# Patient Record
Sex: FEMALE | Race: WHITE | NOT HISPANIC OR LATINO | URBAN - METROPOLITAN AREA
[De-identification: names, ages, dates, MRNs, and addresses within clinical notes are randomized per-mention and may not be internally consistent; named-entity substitution may affect disease eponyms.]

---

## 2017-02-22 ENCOUNTER — OFFICE VISIT (OUTPATIENT)
Dept: URGENT CARE | Facility: URGENT CARE | Age: 42
End: 2017-02-22
Payer: COMMERCIAL

## 2017-02-22 VITALS
DIASTOLIC BLOOD PRESSURE: 74 MMHG | OXYGEN SATURATION: 97 % | HEART RATE: 93 BPM | RESPIRATION RATE: 16 BRPM | SYSTOLIC BLOOD PRESSURE: 102 MMHG | HEIGHT: 67 IN | BODY MASS INDEX: 19.62 KG/M2 | TEMPERATURE: 99.3 F | WEIGHT: 125 LBS

## 2017-02-22 DIAGNOSIS — R07.0 THROAT PAIN: Primary | ICD-10-CM

## 2017-02-22 LAB
DEPRECATED S PYO AG THROAT QL EIA: NORMAL
MICRO REPORT STATUS: NORMAL
SPECIMEN SOURCE: NORMAL

## 2017-02-22 PROCEDURE — 99213 OFFICE O/P EST LOW 20 MIN: CPT | Performed by: PHYSICIAN ASSISTANT

## 2017-02-22 PROCEDURE — 87081 CULTURE SCREEN ONLY: CPT | Performed by: FAMILY MEDICINE

## 2017-02-22 PROCEDURE — 87880 STREP A ASSAY W/OPTIC: CPT | Performed by: FAMILY MEDICINE

## 2017-02-22 NOTE — MR AVS SNAPSHOT
"              After Visit Summary   2017    Lupe Mendosa    MRN: [id number]           Patient Information     Date Of Birth          1975        Visit Information        Provider Department      2017 6:45 PM Angelia Santiago PA-C [facility]        Today's Diagnoses     Throat pain    -  1       Follow-ups after your visit        Who to contact     If you have questions or need follow up information about today's clinic visit or your schedule please contact [facility] directly at [phone].  Normal or non-critical lab and imaging results will be communicated to you by [email or web address], letter or phone within 4 business days after the clinic has received the results. If you do not hear from us within 7 days, please contact the clinic through [email or web address] or phone. If you have a critical or abnormal lab result, we will notify you by phone as soon as possible.  Submit refill requests through MessageGears or call your pharmacy and they will forward the refill request to us. Please allow 3 business days for your refill to be completed.          Additional Information About Your Visit        MyChart Information     MessageGears lets you send messages to your doctor, view your test results, renew your prescriptions, schedule appointments and more. To sign up, go to [email or web address] . Click on \"Log in\" on the left side of the screen, which will take you to the Welcome page. Then click on \"Sign up Now\" on the right side of the page.     You will be asked to enter the access code listed below, as well as some personal information. Please follow the directions to create your username and password.     Your access code is: [email or web address]  Expires: 2017  8:10 PM     Your access code will  in 90 days. If you need help or a new code, please call your [place] clinic or [phone].        Care EveryWhere ID     This is your Care EveryWhere ID. This could be used by other " "organizations to access your [place] medical records  [id number]        Your Vitals Were     Pulse Temperature Respirations Height Last Period Pulse Oximetry    93 99.3  F (37.4  C) (Oral) 16 5' 7\" (1.702 m) 02/01/2017 97%    Breastfeeding? BMI (Body Mass Index)                No 19.58 kg/m2           Blood Pressure from Last 3 Encounters:   02/22/17 102/74   05/29/15 114/62   05/01/11 110/84    Weight from Last 3 Encounters:   02/22/17 125 lb (56.7 kg)   05/29/15 120 lb (54.4 kg)              We Performed the Following     Beta strep group A culture     Strep, Rapid Screen        Primary Care Provider Office Phone # Fax #    Stacy BRUMFIELD Dhruv [phone]       [facility] [address]  [place] [place] [address]        Thank you!     Thank you for choosing [facility]  for your care. Our goal is always to provide you with excellent care. Hearing back from our patients is one way we can continue to improve our services. Please take a few minutes to complete the written survey that you may receive in the mail after your visit with us. Thank you!             Your Updated Medication List - Protect others around you: Learn how to safely use, store and throw away your medicines at www.disposemymeds.org.          This list is accurate as of: 2/22/17  8:10 PM.  Always use your most recent med list.                   Brand Name Dispense Instructions for use    SYNTHROID PO      Take  by mouth.         "

## 2017-02-23 NOTE — PROGRESS NOTES
HPI  Lupe is a 42 yo female who presents for sore throat x 3 days. Was mostly in the morning and night but has felt raw all day today.  Reports mild cough. Denies fever or muscles aches.      ROS  See HPI    Physical Exam    Vitals & nursing notes reviewed.  B/P: 102/74, T: 99.3, P: 93, R: 16  Constitutional:  Alert, well nourished, well-developed, NAD  Head:  Atraumatic, normocephalic  Eyes:  Perrla, EOMI, conjunctiva:  Pink   Sclera:  Anicteric  Ears:  Canals clear BL, TM pearly BL  Throat:  (+) mild erythema, No exudates, or edema to postoropharynx  Neck:  Supple, no cervical LAD  Lungs:  CTA, no wheezes, rhonchi, or rales  CV:  RRR,  no murmur appreciated    ASSESSMENT  (R07.0) Throat pain  (primary encounter diagnosis)  Comment: RST negative. Culture pending  Plan: Warm saline gargle BID-TID. Tylenol or advil for pain & fever PRN.  F/U with PCP in 3-5 days if sx persist or worsen.

## 2017-02-23 NOTE — NURSING NOTE
"Chief Complaint   Patient presents with     Urgent Care     Pharyngitis     sore throat, feels raw since Sunday, some chills.        Initial /74  Pulse 93  Temp 99.3  F (37.4  C) (Oral)  Resp 16  Ht 5' 7\" (1.702 m)  Wt 125 lb (56.7 kg)  LMP 02/01/2017  SpO2 97%  Breastfeeding? No  BMI 19.58 kg/m2 Estimated body mass index is 19.58 kg/(m^2) as calculated from the following:    Height as of this encounter: 5' 7\" (1.702 m).    Weight as of this encounter: 125 lb (56.7 kg).  Medication Reconciliation: complete  "

## 2017-02-24 LAB
BACTERIA SPEC CULT: NORMAL
MICRO REPORT STATUS: NORMAL
SPECIMEN SOURCE: NORMAL

## 2017-03-02 ENCOUNTER — OFFICE VISIT - HEALTHEAST (OUTPATIENT)
Dept: FAMILY MEDICINE | Facility: CLINIC | Age: 42
End: 2017-03-02

## 2017-03-02 ENCOUNTER — COMMUNICATION - HEALTHEAST (OUTPATIENT)
Dept: FAMILY MEDICINE | Facility: CLINIC | Age: 42
End: 2017-03-02

## 2017-03-02 ENCOUNTER — RECORDS - HEALTHEAST (OUTPATIENT)
Dept: GENERAL RADIOLOGY | Facility: CLINIC | Age: 42
End: 2017-03-02

## 2017-03-02 DIAGNOSIS — J18.9 PNEUMONIA INVOLVING RIGHT LUNG: ICD-10-CM

## 2017-03-02 DIAGNOSIS — R05.9 COUGH: ICD-10-CM

## 2017-03-02 DIAGNOSIS — E03.9 HYPOTHYROID: ICD-10-CM

## 2017-03-02 ASSESSMENT — MIFFLIN-ST. JEOR: SCORE: 1244.83

## 2017-03-08 ENCOUNTER — COMMUNICATION - HEALTHEAST (OUTPATIENT)
Dept: FAMILY MEDICINE | Facility: CLINIC | Age: 42
End: 2017-03-08

## 2017-03-20 ENCOUNTER — HOSPITAL ENCOUNTER (OUTPATIENT)
Dept: MAMMOGRAPHY | Facility: HOSPITAL | Age: 42
Discharge: HOME | End: 2017-03-20
Attending: FAMILY MEDICINE

## 2017-03-20 DIAGNOSIS — Z12.31 VISIT FOR SCREENING MAMMOGRAM: ICD-10-CM

## 2017-03-31 ENCOUNTER — COMMUNICATION - HEALTHEAST (OUTPATIENT)
Dept: FAMILY MEDICINE | Facility: CLINIC | Age: 42
End: 2017-03-31

## 2017-03-31 DIAGNOSIS — E03.9 HYPOTHYROIDISM: ICD-10-CM

## 2017-04-03 ENCOUNTER — COMMUNICATION - HEALTHEAST (OUTPATIENT)
Dept: FAMILY MEDICINE | Facility: CLINIC | Age: 42
End: 2017-04-03

## 2017-04-03 ENCOUNTER — AMBULATORY - HEALTHEAST (OUTPATIENT)
Dept: LAB | Facility: CLINIC | Age: 42
End: 2017-04-03

## 2017-04-03 DIAGNOSIS — E03.9 HYPOTHYROIDISM: ICD-10-CM

## 2017-04-03 DIAGNOSIS — E03.9 HYPOTHYROID: ICD-10-CM

## 2017-07-10 ENCOUNTER — OFFICE VISIT - HEALTHEAST (OUTPATIENT)
Dept: FAMILY MEDICINE | Facility: CLINIC | Age: 42
End: 2017-07-10

## 2017-07-10 DIAGNOSIS — E03.9 HYPOTHYROID: ICD-10-CM

## 2017-07-10 DIAGNOSIS — Z00.00 ROUTINE GENERAL MEDICAL EXAMINATION AT A HEALTH CARE FACILITY: ICD-10-CM

## 2017-07-10 ASSESSMENT — MIFFLIN-ST. JEOR: SCORE: 1249.37

## 2017-07-20 LAB
BKR LAB AP ABNORMAL BLEEDING: NO
BKR LAB AP BIRTH CONTROL/HORMONES: NORMAL
BKR LAB AP CERVICAL APPEARANCE: NORMAL
BKR LAB AP GYN ADEQUACY: NORMAL
BKR LAB AP GYN INTERPRETATION: NORMAL
BKR LAB AP GYN OTHER FINDINGS: NORMAL
BKR LAB AP HPV REFLEX: NO
BKR LAB AP LMP: NORMAL
BKR LAB AP PATIENT STATUS: NORMAL
BKR LAB AP PREVIOUS ABNORMAL: NO
BKR LAB AP PREVIOUS NORMAL: 2016
HIGH RISK?: NO
PATH REPORT.COMMENTS IMP SPEC: NORMAL
RESULT FLAG (HE HISTORICAL CONVERSION): NORMAL

## 2017-09-22 ENCOUNTER — RECORDS - HEALTHEAST (OUTPATIENT)
Dept: ADMINISTRATIVE | Facility: OTHER | Age: 42
End: 2017-09-22

## 2018-04-18 ENCOUNTER — COMMUNICATION - HEALTHEAST (OUTPATIENT)
Dept: FAMILY MEDICINE | Facility: CLINIC | Age: 43
End: 2018-04-18

## 2018-04-18 DIAGNOSIS — R63.5 UNEXPLAINED WEIGHT GAIN: ICD-10-CM

## 2018-04-18 DIAGNOSIS — E03.9 HYPOTHYROIDISM: ICD-10-CM

## 2018-04-20 ENCOUNTER — AMBULATORY - HEALTHEAST (OUTPATIENT)
Dept: LAB | Facility: CLINIC | Age: 43
End: 2018-04-20

## 2018-04-20 DIAGNOSIS — R63.5 UNEXPLAINED WEIGHT GAIN: ICD-10-CM

## 2018-04-20 DIAGNOSIS — E03.9 HYPOTHYROIDISM: ICD-10-CM

## 2018-04-20 LAB
CHOLEST SERPL-MCNC: 204 MG/DL
FASTING STATUS PATIENT QL REPORTED: YES
FASTING STATUS PATIENT QL REPORTED: YES
GLUCOSE BLD-MCNC: 90 MG/DL (ref 70–99)
HDLC SERPL-MCNC: 58 MG/DL
HGB BLD-MCNC: 12.6 G/DL (ref 12–16)
LDLC SERPL CALC-MCNC: 125 MG/DL
TRIGL SERPL-MCNC: 105 MG/DL
TSH SERPL DL<=0.005 MIU/L-ACNC: 1.26 UIU/ML (ref 0.3–5)

## 2018-04-23 LAB
25(OH)D3 SERPL-MCNC: 19.1 NG/ML (ref 30–80)
25(OH)D3 SERPL-MCNC: 19.1 NG/ML (ref 30–80)

## 2018-06-04 ENCOUNTER — HOSPITAL ENCOUNTER (OUTPATIENT)
Dept: MAMMOGRAPHY | Facility: CLINIC | Age: 43
Discharge: HOME | End: 2018-06-04
Attending: FAMILY MEDICINE

## 2018-06-04 DIAGNOSIS — Z12.31 VISIT FOR SCREENING MAMMOGRAM: ICD-10-CM

## 2018-08-17 ENCOUNTER — COMMUNICATION - HEALTHEAST (OUTPATIENT)
Dept: FAMILY MEDICINE | Facility: CLINIC | Age: 43
End: 2018-08-17

## 2018-08-17 DIAGNOSIS — E03.9 HYPOTHYROID: ICD-10-CM

## 2018-08-28 ENCOUNTER — COMMUNICATION - HEALTHEAST (OUTPATIENT)
Dept: FAMILY MEDICINE | Facility: CLINIC | Age: 43
End: 2018-08-28

## 2018-09-10 ENCOUNTER — OFFICE VISIT - HEALTHEAST (OUTPATIENT)
Dept: FAMILY MEDICINE | Facility: CLINIC | Age: 43
End: 2018-09-10

## 2018-09-10 DIAGNOSIS — E55.9 VITAMIN D DEFICIENCY: ICD-10-CM

## 2018-09-10 DIAGNOSIS — E03.9 ACQUIRED HYPOTHYROIDISM: ICD-10-CM

## 2018-09-10 DIAGNOSIS — Z00.00 ENCOUNTER FOR ROUTINE ADULT HEALTH EXAMINATION WITHOUT ABNORMAL FINDINGS: ICD-10-CM

## 2018-09-10 DIAGNOSIS — M54.2 NECK PAIN: ICD-10-CM

## 2018-09-10 LAB
25(OH)D3 SERPL-MCNC: 25.4 NG/ML (ref 30–80)
25(OH)D3 SERPL-MCNC: 25.4 NG/ML (ref 30–80)
TSH SERPL DL<=0.005 MIU/L-ACNC: 1.54 UIU/ML (ref 0.3–5)

## 2018-09-10 ASSESSMENT — MIFFLIN-ST. JEOR: SCORE: 1266.38

## 2018-09-12 LAB
BKR LAB AP ABNORMAL BLEEDING: NO
BKR LAB AP BIRTH CONTROL/HORMONES: NORMAL
BKR LAB AP CERVICAL APPEARANCE: NORMAL
BKR LAB AP GYN ADEQUACY: NORMAL
BKR LAB AP GYN INTERPRETATION: NORMAL
BKR LAB AP GYN OTHER FINDINGS: NORMAL
BKR LAB AP HPV REFLEX: NORMAL
BKR LAB AP LMP: NORMAL
BKR LAB AP PATIENT STATUS: NORMAL
BKR LAB AP PREVIOUS ABNORMAL: NORMAL
BKR LAB AP PREVIOUS NORMAL: NORMAL
HIGH RISK?: NO
PATH REPORT.COMMENTS IMP SPEC: NORMAL
RESULT FLAG (HE HISTORICAL CONVERSION): NORMAL

## 2018-12-07 ENCOUNTER — COMMUNICATION - HEALTHEAST (OUTPATIENT)
Dept: FAMILY MEDICINE | Facility: CLINIC | Age: 43
End: 2018-12-07

## 2018-12-07 DIAGNOSIS — E03.9 HYPOTHYROID: ICD-10-CM

## 2019-05-20 ENCOUNTER — COMMUNICATION - HEALTHEAST (OUTPATIENT)
Dept: FAMILY MEDICINE | Facility: CLINIC | Age: 44
End: 2019-05-20

## 2019-05-21 ENCOUNTER — RECORDS - HEALTHEAST (OUTPATIENT)
Dept: MAMMOGRAPHY | Facility: CLINIC | Age: 44
End: 2019-05-21

## 2019-05-21 DIAGNOSIS — Z12.31 ENCOUNTER FOR SCREENING MAMMOGRAM FOR MALIGNANT NEOPLASM OF BREAST: ICD-10-CM

## 2019-08-18 ENCOUNTER — COMMUNICATION - HEALTHEAST (OUTPATIENT)
Dept: FAMILY MEDICINE | Facility: CLINIC | Age: 44
End: 2019-08-18

## 2019-08-18 DIAGNOSIS — E03.9 HYPOTHYROID: ICD-10-CM

## 2019-11-26 ENCOUNTER — COMMUNICATION - HEALTHEAST (OUTPATIENT)
Dept: FAMILY MEDICINE | Facility: CLINIC | Age: 44
End: 2019-11-26

## 2019-11-26 DIAGNOSIS — E03.9 HYPOTHYROID: ICD-10-CM

## 2020-07-28 ENCOUNTER — COMMUNICATION - HEALTHEAST (OUTPATIENT)
Dept: FAMILY MEDICINE | Facility: CLINIC | Age: 45
End: 2020-07-28

## 2020-09-17 ENCOUNTER — COMMUNICATION - HEALTHEAST (OUTPATIENT)
Dept: FAMILY MEDICINE | Facility: CLINIC | Age: 45
End: 2020-09-17

## 2020-09-24 ENCOUNTER — COMMUNICATION - HEALTHEAST (OUTPATIENT)
Dept: FAMILY MEDICINE | Facility: CLINIC | Age: 45
End: 2020-09-24

## 2020-12-04 ENCOUNTER — COMMUNICATION - HEALTHEAST (OUTPATIENT)
Dept: FAMILY MEDICINE | Facility: CLINIC | Age: 45
End: 2020-12-04

## 2020-12-04 DIAGNOSIS — E03.9 HYPOTHYROID: ICD-10-CM

## 2020-12-28 ENCOUNTER — COMMUNICATION - HEALTHEAST (OUTPATIENT)
Dept: FAMILY MEDICINE | Facility: CLINIC | Age: 45
End: 2020-12-28

## 2021-04-14 ENCOUNTER — HOSPITAL ENCOUNTER (OUTPATIENT)
Dept: MAMMOGRAPHY | Facility: CLINIC | Age: 46
Discharge: HOME | End: 2021-04-14
Attending: FAMILY MEDICINE

## 2021-04-14 DIAGNOSIS — Z12.31 VISIT FOR SCREENING MAMMOGRAM: ICD-10-CM

## 2021-04-20 ENCOUNTER — HOSPITAL ENCOUNTER (OUTPATIENT)
Dept: ULTRASOUND IMAGING | Facility: CLINIC | Age: 46
Discharge: HOME | End: 2021-04-20
Attending: FAMILY MEDICINE

## 2021-04-20 ENCOUNTER — HOSPITAL ENCOUNTER (OUTPATIENT)
Dept: MAMMOGRAPHY | Facility: CLINIC | Age: 46
Discharge: HOME | End: 2021-04-20
Attending: FAMILY MEDICINE

## 2021-04-20 DIAGNOSIS — N64.89 BREAST ASYMMETRY: ICD-10-CM

## 2021-05-13 ENCOUNTER — OFFICE VISIT - HEALTHEAST (OUTPATIENT)
Dept: FAMILY MEDICINE | Facility: CLINIC | Age: 46
End: 2021-05-13

## 2021-05-13 DIAGNOSIS — E55.9 VITAMIN D DEFICIENCY: ICD-10-CM

## 2021-05-13 DIAGNOSIS — E03.9 ACQUIRED HYPOTHYROIDISM: ICD-10-CM

## 2021-05-13 DIAGNOSIS — Z00.00 ROUTINE GENERAL MEDICAL EXAMINATION AT A HEALTH CARE FACILITY: ICD-10-CM

## 2021-05-13 LAB
CHOLEST SERPL-MCNC: 226 MG/DL
FASTING STATUS PATIENT QL REPORTED: YES
FASTING STATUS PATIENT QL REPORTED: YES
GLUCOSE BLD-MCNC: 91 MG/DL (ref 70–125)
HDLC SERPL-MCNC: 69 MG/DL
HGB BLD-MCNC: 11.4 G/DL (ref 12–16)
LDLC SERPL CALC-MCNC: 136 MG/DL
TRIGL SERPL-MCNC: 107 MG/DL
TSH SERPL DL<=0.005 MIU/L-ACNC: 2.14 UIU/ML (ref 0.3–5)

## 2021-05-13 ASSESSMENT — MIFFLIN-ST. JEOR: SCORE: 1298.36

## 2021-05-14 LAB
25(OH)D3 SERPL-MCNC: 30.5 NG/ML (ref 30–80)
25(OH)D3 SERPL-MCNC: 30.5 NG/ML (ref 30–80)
HPV SOURCE: NORMAL
HUMAN PAPILLOMA VIRUS 16 DNA: NEGATIVE
HUMAN PAPILLOMA VIRUS 18 DNA: NEGATIVE
HUMAN PAPILLOMA VIRUS FINAL DIAGNOSIS: NORMAL
HUMAN PAPILLOMA VIRUS OTHER HR: NEGATIVE
SPECIMEN DESCRIPTION: NORMAL

## 2021-05-19 LAB
BKR LAB AP ABNORMAL BLEEDING: NO
BKR LAB AP BIRTH CONTROL/HORMONES: NORMAL
BKR LAB AP CERVICAL APPEARANCE: NORMAL
BKR LAB AP GYN ADEQUACY: NORMAL
BKR LAB AP GYN INTERPRETATION: NORMAL
BKR LAB AP HPV REFLEX: NORMAL
BKR LAB AP LMP: NORMAL
BKR LAB AP PATIENT STATUS: NORMAL
BKR LAB AP PREVIOUS ABNORMAL: NO
BKR LAB AP PREVIOUS NORMAL: 2018
HIGH RISK?: NO
PATH REPORT.COMMENTS IMP SPEC: NORMAL
RESULT FLAG (HE HISTORICAL CONVERSION): NORMAL

## 2021-05-27 ENCOUNTER — RECORDS - HEALTHEAST (OUTPATIENT)
Dept: ADMINISTRATIVE | Facility: CLINIC | Age: 46
End: 2021-05-27

## 2021-05-27 VITALS
OXYGEN SATURATION: 99 % | WEIGHT: 139.3 LBS | SYSTOLIC BLOOD PRESSURE: 122 MMHG | HEIGHT: 67 IN | HEART RATE: 86 BPM | BODY MASS INDEX: 21.87 KG/M2 | DIASTOLIC BLOOD PRESSURE: 78 MMHG

## 2021-05-29 ENCOUNTER — RECORDS - HEALTHEAST (OUTPATIENT)
Dept: ADMINISTRATIVE | Facility: CLINIC | Age: 46
End: 2021-05-29

## 2021-05-30 VITALS — HEIGHT: 67 IN | BODY MASS INDEX: 20.25 KG/M2 | WEIGHT: 129 LBS

## 2021-05-31 VITALS — WEIGHT: 130 LBS | HEIGHT: 67 IN | BODY MASS INDEX: 20.4 KG/M2

## 2021-05-31 NOTE — TELEPHONE ENCOUNTER
Refill Approved    Rx renewed per Medication Renewal Policy. Medication was last renewed on 12/9/2018        Ayan Field, [facility] Triage/Med Refill 8/18/2019     Requested Prescriptions   Pending Prescriptions Disp Refills     levothyroxine (SYNTHROID, LEVOTHROID) 75 MCG tablet [Pharmacy Med Name: LEVOTHYROXINE 0.075MG (75MCG) TABS] 90 tablet 0     Sig: TAKE 1 TABLET BY MOUTH EVERY DAY AT 6 AM       Thyroid Hormones Protocol Passed - 8/18/2019  3:36 AM        Passed - Provider visit in past 12 months or next 3 months     Last office visit with prescriber/PCP: Visit date not found OR same dept: Visit date not found OR same specialty: 3/2/2017 Angelina Troy CNP  Last physical: 9/10/2018 Last MTM visit: Visit date not found   Next visit within 3 mo: Visit date not found  Next physical within 3 mo: Visit date not found  Prescriber OR PCP: Stacy Bartlett MD  Last diagnosis associated with med order: 1. Hypothyroid  - levothyroxine (SYNTHROID, LEVOTHROID) 75 MCG tablet [Pharmacy Med Name: LEVOTHYROXINE 0.075MG (75MCG) TABS]; TAKE 1 TABLET BY MOUTH EVERY DAY AT 6 AM  Dispense: 90 tablet; Refill: 0    If protocol passes may refill for 12 months if within 3 months of last provider visit (or a total of 15 months).             Passed - TSH on file in past 12 months for patient age 12 & older     TSH   Date Value Ref Range Status   09/10/2018 1.54 0.30 - 5.00 uIU/mL Final

## 2021-06-02 VITALS — BODY MASS INDEX: 20.72 KG/M2 | WEIGHT: 132 LBS | HEIGHT: 67 IN

## 2021-06-03 NOTE — TELEPHONE ENCOUNTER
RN cannot approve Refill Request    RN can NOT refill this medication PCP messaged that patient is overdue for Labs and Office Visit. Last office visit: Visit date not found Last Physical: 9/10/2018 Last MTM visit: Visit date not found Last visit same specialty: 3/2/2017 Angelina Troy CNP.  Next visit within 3 mo: Visit date not found  Next physical within 3 mo: Visit date not found      Bailee ZIMMER Patty, [facility] Triage/Med Refill 11/28/2019    Requested Prescriptions   Pending Prescriptions Disp Refills     levothyroxine (SYNTHROID, LEVOTHROID) 75 MCG tablet 60 tablet 0       Thyroid Hormones Protocol Failed - 11/26/2019  8:13 AM        Failed - Provider visit in past 12 months or next 3 months     Last office visit with prescriber/PCP: Visit date not found OR same dept: Visit date not found OR same specialty: 3/2/2017 Angelina Troy CNP  Last physical: 9/10/2018 Last MTM visit: Visit date not found   Next visit within 3 mo: Visit date not found  Next physical within 3 mo: Visit date not found  Prescriber OR PCP: Stacy Bartlett MD  Last diagnosis associated with med order: 1. Hypothyroid  - levothyroxine (SYNTHROID, LEVOTHROID) 75 MCG tablet  Dispense: 60 tablet; Refill: 0    If protocol passes may refill for 12 months if within 3 months of last provider visit (or a total of 15 months).             Failed - TSH on file in past 12 months for patient age 12 & older     TSH   Date Value Ref Range Status   09/10/2018 1.54 0.30 - 5.00 uIU/mL Final

## 2021-06-09 NOTE — PROGRESS NOTES
"  Subjective   Chief Complaint:  Cough (pt states she has been sick since 2/19, had an RST on 2/22 which was negative; 10 year old child was dx with pneumonia in early february); Fatigue (needs to nap once or twice a day); and Fever (low grade fever around )    HPI:   Lupe Mendosa is a 41 y.o. female who presents for evaluation of cough and sore throat.      She states illness began almost two weeks ago with severe sore throat.  She was seen in Urgent Care 2/22 and RST was negative.  She reports sore throat eventually cleared though cough worsened.  Is productive, feels heavy in chest.  Has felt fatigued throughout illness though markedly worse last few days.  Low grade fever at home, 102.1 in clinic.      No exposure to influenza or strep.  No prior history of respiratory issues.      PMH:   Patient Active Problem List   Diagnosis     Nephrolithiasis     Hypothyroidism       Past Medical History:   Diagnosis Date     Hypothyroidism        Current Medications:   Current Outpatient Prescriptions on File Prior to Visit   Medication Sig Dispense Refill     levothyroxine (SYNTHROID, LEVOTHROID) 75 MCG tablet TAKE 1 TABLET BY MOUTH DAILY 90 tablet 3     No current facility-administered medications on file prior to visit.        Allergies:  is allergic to amoxicillin and meperidine.    SH/FH:  Social History and Family History reviewed and updated.   Tobacco Status:  She  reports that she has never smoked. She has never used smokeless tobacco.    Review of Systems:  A complete head to toe ROS is negative unless otherwise noted in HPI    Objective     Vitals:    03/02/17 1336   BP: 120/60   Patient Site: Left Arm   Patient Position: Sitting   Cuff Size: Adult Regular   Pulse: (!) 117   Temp: (!) 102.1  F (38.9  C)   TempSrc: Oral   SpO2: 98%   Weight: 129 lb (58.5 kg)   Height: 5' 6.5\" (1.689 m)     Wt Readings from Last 3 Encounters:   03/02/17 129 lb (58.5 kg)   03/08/16 129 lb (58.5 kg)   02/25/16 (P) 127 lb " (57.6 kg)       Physical Exam:  GENERAL: Alert, appears fatigued  HEAD: Normocephalic, atraumatic  EYES: Conjunctiva pink, sclera white, no exudates.   EARS: TMs pearly grey, no bulging, redness, retraction. Hearing grossly normal.   NOSE: Nares patent, no discharge.   MOUTH: Pharynx moist, pink without exudate. No tonsillar enlargement  NECK: No lymphadenopathy.   CV: Regular rate and rhythm without murmurs, rubs or gallops.  RESP:  Rales bilaterally, faint expiratory wheeze.     Xray: Infiltrates in RUL, RML    Labs:    No results found for this or any previous visit (from the past 168 hour(s)).    Assessment & Plan   1. Pneumonia involving right lung:  Infiltrates on xray in RUL, RML.  Penicillin allergy.  Will treat with doxycycline.  Reviewed expected timeframe for resolution of symptoms.  If no improvement in 72 hours, return for reevaluation.   - XR Chest PA and Lateral; Future  - doxycycline (MONODOX) 100 MG capsule; Take 1 capsule (100 mg total) by mouth 2 (two) times a day for 10 days.  Dispense: 20 capsule; Refill: 0    Angelina Troy, CNP

## 2021-06-11 NOTE — TELEPHONE ENCOUNTER
Patient Returning Call  Reason for call:  Returning vm   Information relayed to patient:  Relayed message below   Patient has additional questions:  Yes  If YES, what are your questions/concerns:  Current mailing address is the right place to send  [phone]  [place] [address]  Okay to leave a detailed message?: No call back needed

## 2021-06-11 NOTE — PROGRESS NOTES
Assessment/Plan:       1. Routine general medical examination at a health care facility  Healthy 41-year-old female.  She would like to continue with yearly Pap smears.  We will just do cytology today and defer repeat HPV testing only if needed or at 5 years out which will be 2020.  Breast exam is normal.  She will be due for follow-up of her mammogram in September.  She had an abnormal mammogram with biopsy last year which turned out to be normal.  She continues with self breast exams.  She will return for fasting labs in October to include blood sugar lipid cascade hemoglobin and thyroid.  Encouraged healthy eating and exercise.  - Gynecologic Cytology (PAP Smear)  - Glucose; Future  - Lipid [place] FASTING; Future  - Hemoglobin; Future    2. Hypothyroid  Currently denying any symptoms.  Will continue at 75 mcg daily and recheck in 6 months from her last check which will be October.  - levothyroxine (SYNTHROID, LEVOTHROID) 75 MCG tablet; Take 1 tablet (75 mcg total) by mouth daily.  Dispense: 90 tablet; Refill: 4  - Thyroid Cascade; Future            Subjective:     Lupe Mendosa is a 41 y.o. female who presents for an annual exam.  Overall doing well.  This last year was complicated by having a abnormal mammogram resulting in a breast biopsy in her left breast.  She also had a episode of pneumonia.  These have all resolved at the breast biopsy was benign.  She has no new complaints.  She does wish to continue with yearly Pap smears.  She denies any thyroid symptoms.    Healthy Habits:   Healthy Diet: Yes  Regular Exercise: Yes  Sunscreen Use: Yes  Dental Visits Regularly: Yes  Seat Belt: Yes  Self Breast Exam Monthly:Yes    Health Maintenance reviewed - yes.  Lipid Profile: Yes  Glucose Screen: Yes  Last Mammogram: Yes  Colonoscopy: N/A  Last Dexa: N/A    Immunization History   Administered Date(s) Administered     DT (pediatric) 05/23/2000     Influenza, seasonal,quad inj 6-35 mos 10/04/2013     Tdap  2011     Immunization status: up to date and documented.      Gynecologic History  Patient's last menstrual period was 2017.  Sexually active: Yes  Contraception: condoms  Last Pap: . Results were: normal      OB History    Para Term  AB Living   2 2 2      SAB TAB Ectopic Multiple Live Births             # Outcome Date GA Lbr Rian/2nd Weight Sex Delivery Anes PTL Lv   2 Term            1 Term                   Current Outpatient Prescriptions   Medication Sig Dispense Refill     levothyroxine (SYNTHROID, LEVOTHROID) 75 MCG tablet Take 1 tablet (75 mcg total) by mouth daily. 90 tablet 4     No current facility-administered medications for this visit.      Past Medical History:   Diagnosis Date     Hypothyroidism      Past Surgical History:   Procedure Laterality Date     BREAST BIOPSY Left 2016     Amoxicillin and Meperidine  Family History   Problem Relation Age of Onset     Heart disease Father      Heart disease Maternal Aunt      Pancreatic cancer Maternal Grandmother      Lung cancer Maternal Aunt      Social History     Social History     Marital status:      Spouse name: N/A     Number of children: N/A     Years of education: N/A     Occupational History     Not on file.     Social History Main Topics     Smoking status: Never Smoker     Smokeless tobacco: Never Used     Alcohol use 0.6 oz/week     1 Glasses of wine per week     Drug use: No     Sexual activity: Not on file     Other Topics Concern     Not on file     Social History Narrative       Review of Systems  General:  Denies problem  Eyes: Denies problem  Ears/Nose/Throat: Denies problem  Cardiovascular: Denies problem  Respiratory:  Denies problem  Gastrointestinal:  Denies problem, Genitourinary: Denies problem  Musculoskeletal:  Denies problem  Skin: Denies problem  Neurologic: Denies problem  Psychiatric: Denies problem  Endocrine: Denies problem  Heme/Lymphatic: Denies problem   Allergic/Immunologic:  "Denies problem            Objective:        Vitals:    07/10/17 1042   BP: 104/78   Pulse: 64   Resp: 16   Weight: 130 lb (59 kg)   Height: 5' 6.5\" (1.689 m)       Physical Exam:  General Appearance: Alert, pleasant, appears stated age  Head: Normocephalic, without obvious abnormality  Eyes: PERRL, conjunctiva/corneas clear, EOM's intact  Ears: Normal TM's and external ear canals, both ears  Nose: Nares normal, septum midline,mucosa normal, no drainage  Throat: Lips, mucosa, and tongue normal; teeth and gums normal; oropharynx is clear  Neck: Supple,without lymphadenopathy or thyromegally  Lungs: Clear to auscultation bilaterally, respirations unlabored  Breasts: Nopalpable masses, tenderness, asymmetry, or nipple discharge. No axillary or supraclavicular lymphadenopathy  Heart: Regular rate and rhythm, no murmur   Abdomen: Soft, non-tender, no masses, no organomegaly  Pelvic:Normally developed genitalia with no external lesions or eruptions. Vagina and cervix show no lesions, inflammation, discharge or tenderness. No cystocele, No rectocele. Uterus normal.  No adnexal mass or tenderness.    Extremities: Extremities with strong and symmetric pulses, no cyanosis or edema  Skin: Skin color, texture normal, no rashes or lesions    Neurologic: Normal          "

## 2021-06-11 NOTE — TELEPHONE ENCOUNTER
I reached out to pt and no answer. I left a vm. If pt returns call please ask her to confirm her home address so that I can mail her form.

## 2021-06-13 NOTE — TELEPHONE ENCOUNTER
RN cannot approve Refill Request    RN can NOT refill this medication PCP messaged that patient is overdue for Office Visit. Last office visit: Visit date not found Last Physical: 9/10/2018 Last MTM visit: Visit date not found Last visit same specialty: 3/2/2017 Angelina Troy CNP.  Next visit within 3 mo: Visit date not found  Next physical within 3 mo: Visit date not found      Linda Mathis, [facility] Triage/Med Refill 12/5/2020    Requested Prescriptions   Pending Prescriptions Disp Refills     levothyroxine (SYNTHROID, LEVOTHROID) 75 MCG tablet [Pharmacy Med Name: LEVOTHYROXINE 0.075MG (75MCG) TABS] 60 tablet 11     Sig: TAKE 1 TABLET BY MOUTH EVERY DAY AT 6:00AM.       Thyroid Hormones Protocol Failed - 12/4/2020  8:51 AM        Failed - Provider visit in past 12 months or next 3 months     Last office visit with prescriber/PCP: Visit date not found OR same dept: Visit date not found OR same specialty: 3/2/2017 Angelina Troy CNP  Last physical: 9/10/2018 Last MTM visit: Visit date not found   Next visit within 3 mo: Visit date not found  Next physical within 3 mo: Visit date not found  Prescriber OR PCP: Stacy Bartlett MD  Last diagnosis associated with med order: 1. Hypothyroid  - levothyroxine (SYNTHROID, LEVOTHROID) 75 MCG tablet [Pharmacy Med Name: LEVOTHYROXINE 0.075MG (75MCG) TABS]; TAKE 1 TABLET BY MOUTH EVERY DAY AT 6:00AM.  Dispense: 60 tablet; Refill: 11    If protocol passes may refill for 12 months if within 3 months of last provider visit (or a total of 15 months).             Failed - TSH on file in past 12 months for patient age 12 & older     TSH   Date Value Ref Range Status   09/10/2018 1.54 0.30 - 5.00 uIU/mL Final

## 2021-06-16 PROBLEM — E55.9 VITAMIN D DEFICIENCY: Status: ACTIVE | Noted: 2018-09-10

## 2021-06-17 NOTE — PROGRESS NOTES
ASSESSMENT/PLAN:  1. Routine general medical examination at a health care facility  Healthy 45-year-old female.  Pap smear is completed.  Fasting labs are drawn.  Encourage yearly mammograms.  Encouraged healthy eating and exercise habits.  She is received her Covid vaccination.  She is otherwise up-to-date on vaccinations.  We discussed perimenopausal symptoms although she is not having any currently.  - Lipid [place] FASTING  - Hemoglobin  - Glucose  - Gynecologic Cytology (PAP Smear)  - HPV High Risk DNA Cervical    2. Acquired hypothyroidism  Thyroid seems to be in range, no active thyroid symptoms.  We will check a thyroid cascade and adjust levothyroxine as needed.  - Thyroid Cascade    3. Vitamin D deficiency  History of vitamin D deficiency currently on a vitamin D supplement.  - Vitamin D, Total (25-Hydroxy)      General Health Maintenance    [employer] dictation was used for this note.  Speech recognition errors are a possibility.        No follow-ups on file.  There are no Patient Instructions on file for this visit.    Orders Placed This Encounter   Procedures     Lipid [place] FASTING     Order Specific Question:   Fasting is required?     Answer:   Yes     Thyroid Cascade     Vitamin D, Total (25-Hydroxy)     Hemoglobin     Glucose     There are no discontinued medications.      CHIEF COMPLAINT:  Chief Complaint   Patient presents with     Annual Exam       HISTORY OF PRESENT ILLNESS:  Lupe is a 45 y.o. female presenting to the clinic today for an annual exam.She has no complaints today.  She states that she feels her thyroid is within range.  She has been healthy over the past year.  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  She states that the Covid pandemic has been mentally stressful for her family but she seems to be doing okay.  She does try to eat healthy and remain active.  She has a garden that she enjoys  taking care of at home.  She has no new health concerns today.    Remainder of 12-point ROS is negative.      Social History     Tobacco Use   Smoking Status Never Smoker   Smokeless Tobacco Never Used       Family History   Problem Relation Age of Onset     Heart disease Father      Heart disease Maternal Aunt      Pancreatic cancer Maternal Grandmother      Lung cancer Maternal Aunt        Social History     Socioeconomic History     Marital status:      Spouse name: Not on file     Number of children: Not on file     Years of education: Not on file     Highest education level: Not on file   Occupational History     Not on file   Social Needs     Financial resource strain: Not on file     Food insecurity     Worry: Not on file     Inability: Not on file     Transportation needs     Medical: Not on file     Non-medical: Not on file   Tobacco Use     Smoking status: Never Smoker     Smokeless tobacco: Never Used   Substance and Sexual Activity     Alcohol use: Yes     Alcohol/week: 1.0 standard drinks     Types: 1 Glasses of wine per week     Drug use: No     Sexual activity: Not on file   Lifestyle     Physical activity     Days per week: Not on file     Minutes per session: Not on file     Stress: Not on file   Relationships     Social connections     Talks on phone: Not on file     Gets together: Not on file     Attends Yazidism service: Not on file     Active member of club or organization: Not on file     Attends meetings of clubs or organizations: Not on file     Relationship status: Not on file     Intimate partner violence     Fear of current or ex partner: Not on file     Emotionally abused: Not on file     Physically abused: Not on file     Forced sexual activity: Not on file   Other Topics Concern     Not on file   Social History Narrative     Not on file       Past Surgical History:   Procedure Laterality Date     BREAST BIOPSY Left 02/29/2016    benign       Allergies   Allergen Reactions      "Amoxicillin Rash     Meperidine Rash       Active Ambulatory Problems     Diagnosis Date Noted     Nephrolithiasis      Hypothyroidism      Vitamin D deficiency 09/10/2018     Resolved Ambulatory Problems     Diagnosis Date Noted     Seborrheic Keratosis      Fatigue      Urinary Tract Infection      Pain During Urination (Dysuria)      Bereavement Without Complications      No Additional Past Medical History       VITALS:  Vitals:    05/13/21 0900   BP: 122/78   Patient Site: Right Arm   Patient Position: Sitting   Cuff Size: Adult Regular   Pulse: 86   SpO2: 99%   Weight: 139 lb 4.8 oz (63.2 kg)   Height: 5' 6.93\" (1.7 m)     Wt Readings from Last 3 Encounters:   05/13/21 139 lb 4.8 oz (63.2 kg)   09/10/18 132 lb (59.9 kg)   07/10/17 130 lb (59 kg)     Body mass index is 21.86 kg/m .    PHYSICAL EXAM:  General Appearance: Alert, pleasant, appears stated age  Head: Normocephalic, without obvious abnormality  Eyes: PERRL, conjunctiva/corneas clear, EOM's intact  Ears: Normal TM's and external ear canals, both ears  Nose: Nares normal, septum midline,mucosa normal, no drainage  Throat: Lips, mucosa, and tongue normal; teeth and gums normal; oropharynx is clear  Neck: Supple,without lymphadenopathy or thyromegaly  Lungs: Clear to auscultation bilaterally, respirations unlabored  Breasts: No palpable masses, tenderness, asymmetry, or nipple discharge. No axillary or supraclavicular lymphadenopathy  Heart: Regular rate and rhythm, no murmur   Abdomen: Soft, non-tender, no masses, no organomegaly  Pelvic: Normally developed genitalia with no external lesions or eruptions. Vagina and cervix show no lesions. No cystocele, No rectocele. Uterus normal.  No adnexal mass or tenderness.  Extremities: Extremities with strong and symmetric pulses, no cyanosis or edema  Skin: Skin color, texture normal, no rashes or lesions  Neuro: Normal    RECENT RESULTS  Recent Results (from the past 48 hour(s))   Lipid [place] FASTING    " Collection Time: 05/13/21  9:59 AM   Result Value Ref Range    Cholesterol 226 (H) <=199 mg/dL    Triglycerides 107 <=149 mg/dL    HDL Cholesterol 69 >=50 mg/dL    LDL Calculated 136 (H) <=129 mg/dL    Patient Fasting > 8hrs? Yes    Thyroid [place]    Collection Time: 05/13/21  9:59 AM   Result Value Ref Range    TSH 2.14 0.30 - 5.00 uIU/mL   Vitamin D, Total (25-Hydroxy)    Collection Time: 05/13/21  9:59 AM   Result Value Ref Range    Vitamin D, Total (25-Hydroxy) 30.5 30.0 - 80.0 ng/mL   Hemoglobin    Collection Time: 05/13/21  9:59 AM   Result Value Ref Range    Hemoglobin 11.4 (L) 12.0 - 16.0 g/dL   Glucose    Collection Time: 05/13/21  9:59 AM   Result Value Ref Range    Glucose 91 70 - 125 mg/dL    Patient Fasting > 8hrs? Yes        MEDICATIONS:  Current Outpatient Medications   Medication Sig Dispense Refill     levothyroxine (SYNTHROID, LEVOTHROID) 75 MCG tablet TAKE 1 TABLET BY MOUTH EVERY DAY AT 6:00AM. 60 tablet 11     No current facility-administered medications for this visit.

## 2021-06-20 NOTE — PROGRESS NOTES
ASSESSMENT/PLAN:  1. Encounter for routine adult health examination without abnormal findings  Healthy 43-year-old female.  She prefers to continue with yearly Pap smears.  We recommend yearly screening mammograms.  She continues to eat healthy and exercise regularly.  Her BMI is normal.  We discussed getting adequate calcium in her diet.  - Gynecologic Cytology (PAP Smear)    2. Neck pain  She has had some recent pain in her neck and some radicular symptoms into the upper arm and chest.  Will order an MRI scan for further evaluation.  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  - MR Cervical Spine Without Contrast; Future    3. Vitamin D deficiency  Known history of vitamin D deficiency.  We will recheck levels today and recommend vitamin D dosing.  - Vitamin D, Total (25-Hydroxy)    4. Acquired hypothyroidism  Is not endorsing any thyroid symptoms currently.  She is taking levothyroxine at 75 mcg daily.  - Thyroid [place]      General Health Maintenance  Her most recent mammogram was completed in 2018 and revealed no radiographic evidence of malignancy. She is to continue routine screening mammogram as recommended annually.   Her PAP smear is done today.    Patient Instructions   A referral is placed for your MRI, schedule this at your earliest convenience.     Continue to exercise regularly and make healthy food choices.      Orders Placed This Encounter   Procedures     MR Cervical Spine Without Contrast     Standing Status:   Future     Standing Expiration Date:   9/10/2019     Order Specific Question:   Reason for Exam (Describe Symptoms):     Answer:   left neck pain with radiculopathy into shoulder and upper chest     Order Specific Question:   Is the patient pregnant?     Answer:   No     Order Specific Question:   Can the procedure be changed per Radiologist protocol?     Answer:   Yes     Order Specific Question:   If this is a diagnostic procedure, have the patient's age and recent  imaging history been considered?     Answer:   Yes     Order Specific Question:   Is the patient claustrophobic and in need of sedation to complete their MR scan?     Answer:   No     Thyroid Cascade     Vitamin D, Total (25-Hydroxy)     There are no discontinued medications.    Return in about 1 year (around 9/10/2019) for Annual physical.    CHIEF COMPLAINT:  Chief Complaint   Patient presents with     Annual Exam     requests pap today and declines flu today       HISTORY OF PRESENT ILLNESS:  Lupe is a 43 y.o. female presenting to the clinic today for an annual exam.    Health Maintenance: Her PAP smear is completed today. She declines the influenza immunization today.    REVIEW OF SYSTEMS:   Hypothyroidism: She continues on levothyroxine 75 mcg twice daily and tolerates this well. She denies thyroid symptoms.     Costochondritis: She notes a recent visit in Urgent Care 8/11/18 for chest pain and myalgia. She endorses long-standing neck and shoulder pain related to her work. She states that it does flare up during her busy season. She has previously tried chiropractic intervention and notes having a neck X-ray last June that was unremarkable. Last month, however, her neck and shoulder pain worsened and she also experienced numbness in her left arm and chest pain with deep breaths. This continued to worsen over 3 days, which led to her evaluation in UR. She was given ibuprofen 800 mg as needed. Her discomfort improved after 2 days and the numbness has since resolved.     She denies changes in her vision or hearing. She denies urinary symptoms. All other systems are negative.    PFSH:  Her daughter started high school this year. Reviewed, as below.    History   Smoking Status     Never Smoker   Smokeless Tobacco     Never Used       Family History   Problem Relation Age of Onset     Heart disease Father      Heart disease Maternal Aunt      Pancreatic cancer Maternal Grandmother      Lung cancer Maternal Aunt   "      Social History     Social History     Marital status:      Spouse name: N/A     Number of children: N/A     Years of education: N/A     Occupational History     Not on file.     Social History Main Topics     Smoking status: Never Smoker     Smokeless tobacco: Never Used     Alcohol use 0.6 oz/week     1 Glasses of wine per week     Drug use: No     Sexual activity: Not on file     Other Topics Concern     Not on file     Social History Narrative       Past Surgical History:   Procedure Laterality Date     BREAST BIOPSY Left 02/29/2016    benign       Allergies   Allergen Reactions     Amoxicillin Rash     Meperidine Rash       Active Ambulatory Problems     Diagnosis Date Noted     Nephrolithiasis      Hypothyroidism      Vitamin D deficiency 09/10/2018     Resolved Ambulatory Problems     Diagnosis Date Noted     Seborrheic Keratosis      Fatigue      Urinary Tract Infection      Pain During Urination (Dysuria)      Bereavement Without Complications      Past Medical History:   Diagnosis Date     Hypothyroidism        VITALS:  Vitals:    09/10/18 0935   BP: 112/76   Pulse: 64   Resp: 16   Weight: 132 lb (59.9 kg)   Height: 5' 7\" (1.702 m)     Wt Readings from Last 3 Encounters:   09/10/18 132 lb (59.9 kg)   07/10/17 130 lb (59 kg)   03/02/17 129 lb (58.5 kg)     Body mass index is 20.67 kg/(m^2).    PHYSICAL EXAM:  General Appearance: Alert, pleasant, appears stated age  Head: Normocephalic, without obvious abnormality  Eyes: PERRL, conjunctiva/corneas clear, EOM's intact  Ears: Normal TM's and external ear canals, both ears  Nose: Nares normal, septum midline,mucosa normal, no drainage  Throat: Lips, mucosa, and tongue normal; teeth and gums normal; oropharynx is clear  Neck: Supple,without lymphadenopathy or thyromegaly  Lungs: Clear to auscultation bilaterally, respirations unlabored  Breasts: No palpable masses, tenderness, asymmetry, or nipple discharge. No axillary or supraclavicular " lymphadenopathy  Heart: Regular rate and rhythm, no murmur   Abdomen: Soft, non-tender, no masses, no organomegaly  Pelvic: Normally developed genitalia with no external lesions or eruptions. Vagina and cervix show no lesions. No cystocele, No rectocele. Uterus normal.   Extremities: Extremities with strong and symmetric pulses, no cyanosis or edema  Skin: Skin color, texture normal, no rashes or lesions  Neuro: Normal    RECENT RESULTS  No results found for this or any previous visit (from the past 48 hour(s)).    ADDITIONAL HISTORY SUMMARIZED (2): None.  DECISION TO OBTAIN EXTRA INFORMATION (1): None.   RADIOLOGY TESTS (1): MR Cervical Spine ordered today.  LABS (1): Labs ordered today. Labs 4/20/18 reviewed, TSH 1.26, cholesterol 204, glucose 90, Hgb 12.6, vitamin D 19.1.  MEDICINE TESTS (1): None.  INDEPENDENT REVIEW (2 each): None.     The visit lasted a total of 17 minutes face to face with the patient. Over 50% of the time was spent counseling and educating the patient about neck pain, hypothyroidism, and routine health maintenance.    IHilary, am scribing for and in the presence of, Dr. Bartlett.    IDr. Bartlett personally performed the services described in this documentation, as scribed by Hilary Aguayo in my presence, and it is both accurate and complete.    [employer] dictation was used for this note.  Speech recognition errors are a possibility.    MEDICATIONS:  Current Outpatient Prescriptions   Medication Sig Dispense Refill     levothyroxine (SYNTHROID, LEVOTHROID) 75 MCG tablet Take 1 tablet (75 mcg total) by mouth Daily at 6:00 am. 90 tablet 0     No current facility-administered medications for this visit.        Total data points: 2

## 2021-06-28 ENCOUNTER — AMBULATORY - HEALTHEAST (OUTPATIENT)
Dept: NURSING | Facility: CLINIC | Age: 46
End: 2021-06-28

## 2021-06-28 ENCOUNTER — AMBULATORY - HEALTHEAST (OUTPATIENT)
Dept: FAMILY MEDICINE | Facility: CLINIC | Age: 46
End: 2021-06-28

## 2021-07-22 ENCOUNTER — RECORDS - HEALTHEAST (OUTPATIENT)
Dept: FAMILY MEDICINE | Facility: CLINIC | Age: 46
End: 2021-07-22

## 2021-07-22 DIAGNOSIS — Z12.31 OTHER SCREENING MAMMOGRAM: ICD-10-CM

## 2021-09-05 ENCOUNTER — HEALTH MAINTENANCE LETTER (OUTPATIENT)
Age: 46
End: 2021-09-05

## 2021-12-16 DIAGNOSIS — E03.9 HYPOTHYROID: Primary | ICD-10-CM

## 2021-12-19 RX ORDER — LEVOTHYROXINE SODIUM 75 UG/1
TABLET ORAL
Qty: 90 TABLET | Refills: 1 | Status: SHIPPED | OUTPATIENT
Start: 2021-12-19 | End: 2022-06-24

## 2021-12-19 NOTE — TELEPHONE ENCOUNTER
"Outpatient Medication Detail     Disp Refills Start End MICHEL   levothyroxine (SYNTHROID, LEVOTHROID) 75 MCG tablet 60 tablet 11 12/7/2020  No   Sig: TAKE 1 TABLET BY MOUTH EVERY DAY AT 6:00AM.   Sent to pharmacy as: levothyroxine 75 mcg tablet (SYNTHROID, LEVOTHROID)   E-Prescribing Status: Receipt confirmed by pharmacy (12/7/2020 12:46 PM CST)       levothyroxine (SYNTHROID, LEVOTHROID) 75 MCG tablet [[id number]]    Electronically signed by: Stacy Bartlett MD on 12/07/20 1246 Status: Active   Ordering user: Stacy Bartlett MD 12/07/20 1246 Authorized by: Stacy Bartlett MD   Frequency:  12/07/20 - Until Discontinued Released by: Stacy Bartlett MD 12/07/20 1246   Diagnoses  Hypothyroid [E03.9]       Last office visit provider:  5/13/21     Requested Prescriptions   Pending Prescriptions Disp Refills     levothyroxine (SYNTHROID/LEVOTHROID) 75 MCG tablet [Pharmacy Med Name: LEVOTHYROXINE 0.075MG (75MCG) TABS] 60 tablet      Sig: TAKE 1 TABLET BY MOUTH EVERY DAY AT 6:00AM.       Thyroid Protocol Passed - 12/16/2021 10:02 AM        Passed - Patient is 12 years or older        Passed - Recent (12 mo) or future (30 days) visit within the authorizing provider's specialty     Patient has had an office visit with the authorizing provider or a provider within the authorizing providers department within the previous 12 mos or has a future within next 30 days. See \"Patient Info\" tab in inbasket, or \"Choose Columns\" in Meds & Orders section of the refill encounter.              Passed - Medication is active on med list        Passed - Normal TSH on file in past 12 months     Recent Labs   Lab Test 05/13/21  0959   TSH 2.14              Passed - No active pregnancy on record     If patient is pregnant or has had a positive pregnancy test, please check TSH.          Passed - No positive pregnancy test in past 12 months     If patient is pregnant or has had a positive pregnancy test, please check TSH.         "       Krunal Major RN 12/19/21 9:55 AM

## 2022-08-07 ENCOUNTER — HEALTH MAINTENANCE LETTER (OUTPATIENT)
Age: 47
End: 2022-08-07

## 2022-10-14 ENCOUNTER — HOSPITAL ENCOUNTER (OUTPATIENT)
Dept: MAMMOGRAPHY | Facility: CLINIC | Age: 47
Discharge: HOME | End: 2022-10-14
Attending: FAMILY MEDICINE | Admitting: FAMILY MEDICINE
Payer: COMMERCIAL

## 2022-10-14 DIAGNOSIS — Z12.31 VISIT FOR SCREENING MAMMOGRAM: ICD-10-CM

## 2022-10-14 PROCEDURE — 77067 SCR MAMMO BI INCL CAD: CPT

## 2022-10-23 ENCOUNTER — HEALTH MAINTENANCE LETTER (OUTPATIENT)
Age: 47
End: 2022-10-23

## 2022-12-08 ENCOUNTER — NURSE TRIAGE (OUTPATIENT)
Dept: NURSING | Facility: CLINIC | Age: 47
End: 2022-12-08

## 2022-12-08 NOTE — TELEPHONE ENCOUNTER
could not find any appointments. They called patient and instructed her to go to urgent care/walk in per Dr. Munroe's note below. Patient agreeable. Cici Hare RN on 12/8/2022 at 9:52 AM

## 2022-12-08 NOTE — TELEPHONE ENCOUNTER
Schedule clinic apt with any available provider as soon as able - otherwise pt can go to walk in clinic if needed for pain/possible infection    Dr Munroe

## 2022-12-08 NOTE — TELEPHONE ENCOUNTER
Patient states that she just found a tiny lump on her left breast that is painful to touch.  Patient denies fever.  Patient is requesting to be seen in clinic today.  Patient states that her PCP/Clinic is Stacy Bartlett which is leaving currently.  Clinic please phone patient as soon as possible.    Nurse Triage SBAR    Is this a 2nd Level Triage? YES, LICENSED PRACTITIONER REVIEW IS REQUIRED    Situation:   Lump on left breast    Background:   Patient denies fever but states that she just found a lump on left breast.    Assessment:   Patient states that lump is painful too touch.    Protocol Recommended Disposition:   See in Office Today    Recommendation:   Clinic please phone patient as soon as possible as patient is requesting to be seen in clinic today.     Routed to provider    Does the patient meet one of the following criteria for AD[facility] visit consideration? 16+ years old, with an [facility] PCP     TIP  Providers, please consider if this condition is appropriate for management at one of our Acute and Diagnostic Services sites.     If patient is a good candidate, please use dotphrase <dot>triageresponse and select Refer to AD[facility] to document.    Reason for Disposition    Breast looks infected (spreading redness, feels hot or painful to touch) and no fever    Additional Information    Negative: SEVERE breast pain and fever > 103 F  (39.4 C)    Negative: Patient sounds very sick or weak to the triager    Negative: Breast looks infected (spreading redness, feels hot or painful to touch) and fever    Protocols used: BREAST SYMPTOMS-A-[place]

## 2022-12-09 ENCOUNTER — PATIENT OUTREACH (OUTPATIENT)
Dept: ONCOLOGY | Facility: CLINIC | Age: 47
End: 2022-12-09

## 2022-12-09 ENCOUNTER — OFFICE VISIT (OUTPATIENT)
Dept: URGENT CARE | Facility: URGENT CARE | Age: 47
End: 2022-12-09
Payer: COMMERCIAL

## 2022-12-09 VITALS
BODY MASS INDEX: 21.97 KG/M2 | TEMPERATURE: 98 F | RESPIRATION RATE: 16 BRPM | DIASTOLIC BLOOD PRESSURE: 78 MMHG | HEART RATE: 84 BPM | WEIGHT: 140 LBS | SYSTOLIC BLOOD PRESSURE: 112 MMHG | OXYGEN SATURATION: 99 %

## 2022-12-09 DIAGNOSIS — N64.4 BREAST TENDERNESS: Primary | ICD-10-CM

## 2022-12-09 PROCEDURE — 99213 OFFICE O/P EST LOW 20 MIN: CPT | Performed by: PHYSICIAN ASSISTANT

## 2022-12-09 NOTE — PROGRESS NOTES
Assessment & Plan     Breast tenderness    - Breast Provider  Referral   Called [facility] and they informed me that they are not able to do breast or axillary ultrasound or imaging.   Also educated patient on how to set up an appointment with a new primary care provider as hers is no longer practicing.   Diagnosis and treatment plan were discussed with patient and/or parent. If symptoms worsen or do not improve in the next few days, follow-up with your primary care provider or visit an [facility] urgent care clinic location.  Patient verbalizes understanding of all things discussed. All questions were addressed and answered.   See patient instructions        Zofia Stein PA-C  [facility]    Akash Andrade is a 47 year old female who presents to clinic today for the following health issues:  Chief Complaint   Patient presents with     Urgent Care     Chest tenderness or lump on the Lt side x2 days. Pain and tenderness, no fever.      HPI    [place] lump 2 nights left top of breast and now having pain and tenderness to the area. Mammogram a month ago was normal. No nipple discharge or changes to appearance.   She has a history of a biopsy and dense breast tissue.   No fevers, no redness.   No family history of breast cancer.       Review of Systems  Constitutional, HEENT, cardiovascular, pulmonary, gi and gu systems are negative, except as otherwise noted.      Objective    /78   Pulse 84   Temp 98  F (36.7  C) (Oral)   Resp 16   Wt 63.5 kg (140 lb)   SpO2 99%   BMI 21.97 kg/m    Physical Exam   GENERAL: healthy, alert and no distress  NECK: no adenopathy, no asymmetry  RESP: lungs clear to auscultation - no rales, rhonchi or wheezes  BREAST: normal without masses,or nipple discharge and no palpable axillary masses or adenopathy, mild left breast tenderness at 12 oclock in to palpation  CV: regular rate and rhythm, normal S1 S2, no S3 or S4, no  [id number]}

## 2022-12-09 NOTE — PROGRESS NOTES
New Patient Oncology Nurse Navigator Note     Referring provider: Zofia Stein PA-C    Referring Clinic/Organization: [facility]    Referred to (specialty:) Breast Provider Referral      Date Referral Received: December 9, 2022     Evaluation for:  Benign breast condition     Clinical History (per Nurse review of records provided):     Lupe is a 47 year old with a new lump at left top of breast. This is painful and tender.  No fevers, no redness. No nipple discharge or changes to appearance.     10/14/22 - Bilateral screening mammogram  Findings: The breasts are extremely dense, which lowers the sensitivity of mammography.  There are benign findings, not significantly changed of both breasts. There is no radiographic evidence of malignancy. IMPRESSION: ACR BI-RADS Category 2: Benign    She has a left breast biopsy in 2016 after spot compression views confirmed a grouping of tiny calcifications at 12:00.  2/29/2016  LEFT BREAST, UPPER OUTER QUADRANT, STEREOTACTIC CORE BIOPSY:    1) BENIGN BREAST TISSUE WITH PROLIFERATIVE FIBROCYSTIC CHANGES       INCLUDING APOCRINE METAPLASIA, SCLEROSING ADENOSIS, AND BENIGN       DUCTAL HYPERPLASIA OF USUAL TYPE    2) FOCAL ATYPICAL LOBULAR HYPERPLASIA    2) MICROCALCIFICATIONS ARE PRESENT    3) BLACK INK IS CONFIRMED    4) NEGATIVE FOR MALIGNANCY     No family history of breast cancer.     Records Location: See Bookmarked material     Records Needed: Breast imaging for past 5 years    Insket to Adelaide Sarabia to clarify if any further imaging is needed before consult with herself or  Gyn ONc NP    12/9 16:02 - Telephoned and spoke with Lupe to inform her we are working on this referral and need to identify if any further breast imaging is needed prior to scheduling with breast provider. She verbalized understanding and will expect a call once that is clarified.       called patient 12/20 to schedule breast provider with imaging to  follow. Patient states symptoms have resolved and declined scheduling.

## 2022-12-09 NOTE — PROGRESS NOTES
{Diag Picklist:632111}    {2021 E&M time:191181}    Gamaliel Marsh PA-C  [facility]    Subjective   47 year old who presents to clinic today for the following health issues:    Urgent Care       HPI     Chest tenderness or lump on the Lt side x2 days. Pain and tenderness, no fever.     Review of Systems   Review of Systems   See HPI    Objective    Temp: 98  F (36.7  C) Temp src: Oral BP: 112/78 Pulse: 84   Resp: 16 SpO2: 99 %       Physical Exam   Physical Exam     {Diagnostic Test Results (Optional):489929}

## 2023-09-02 ENCOUNTER — HEALTH MAINTENANCE LETTER (OUTPATIENT)
Age: 48
End: 2023-09-02

## 2023-10-16 ENCOUNTER — HOSPITAL ENCOUNTER (OUTPATIENT)
Dept: MAMMOGRAPHY | Facility: CLINIC | Age: 48
Discharge: HOME | End: 2023-10-16
Attending: FAMILY MEDICINE | Admitting: FAMILY MEDICINE
Payer: COMMERCIAL

## 2023-10-16 DIAGNOSIS — Z12.31 VISIT FOR SCREENING MAMMOGRAM: ICD-10-CM

## 2023-10-16 PROCEDURE — 77067 SCR MAMMO BI INCL CAD: CPT

## 2024-10-26 ENCOUNTER — HEALTH MAINTENANCE LETTER (OUTPATIENT)
Age: 49
End: 2024-10-26

## 2024-12-09 ENCOUNTER — HOSPITAL ENCOUNTER (OUTPATIENT)
Dept: MAMMOGRAPHY | Facility: CLINIC | Age: 49
Discharge: HOME | End: 2024-12-09
Attending: FAMILY MEDICINE | Admitting: FAMILY MEDICINE
Payer: COMMERCIAL

## 2024-12-09 DIAGNOSIS — Z12.31 VISIT FOR SCREENING MAMMOGRAM: ICD-10-CM

## 2024-12-09 PROCEDURE — 77063 BREAST TOMOSYNTHESIS BI: CPT

## 2024-12-09 PROCEDURE — 77067 SCR MAMMO BI INCL CAD: CPT

## 2025-06-30 ENCOUNTER — OFFICE VISIT (OUTPATIENT)
Dept: URGENT CARE | Facility: URGENT CARE | Age: 50
End: 2025-06-30
Payer: COMMERCIAL

## 2025-06-30 DIAGNOSIS — Z53.9 ERRONEOUS ENCOUNTER--DISREGARD: Primary | ICD-10-CM
